# Patient Record
Sex: FEMALE | Race: WHITE
[De-identification: names, ages, dates, MRNs, and addresses within clinical notes are randomized per-mention and may not be internally consistent; named-entity substitution may affect disease eponyms.]

---

## 2018-10-20 ENCOUNTER — HOSPITAL ENCOUNTER (INPATIENT)
Dept: HOSPITAL 96 - M.ERS | Age: 78
LOS: 5 days | Discharge: HOME | DRG: 77 | End: 2018-10-25
Attending: FAMILY MEDICINE | Admitting: FAMILY MEDICINE
Payer: COMMERCIAL

## 2018-10-20 VITALS — SYSTOLIC BLOOD PRESSURE: 175 MMHG | DIASTOLIC BLOOD PRESSURE: 78 MMHG

## 2018-10-20 VITALS — SYSTOLIC BLOOD PRESSURE: 202 MMHG | DIASTOLIC BLOOD PRESSURE: 92 MMHG

## 2018-10-20 VITALS — WEIGHT: 123 LBS | HEIGHT: 64.02 IN | BODY MASS INDEX: 21 KG/M2

## 2018-10-20 VITALS — SYSTOLIC BLOOD PRESSURE: 208 MMHG | DIASTOLIC BLOOD PRESSURE: 100 MMHG

## 2018-10-20 VITALS — DIASTOLIC BLOOD PRESSURE: 63 MMHG | SYSTOLIC BLOOD PRESSURE: 179 MMHG

## 2018-10-20 DIAGNOSIS — J44.9: ICD-10-CM

## 2018-10-20 DIAGNOSIS — J96.91: ICD-10-CM

## 2018-10-20 DIAGNOSIS — N28.1: ICD-10-CM

## 2018-10-20 DIAGNOSIS — Z98.49: ICD-10-CM

## 2018-10-20 DIAGNOSIS — R13.12: ICD-10-CM

## 2018-10-20 DIAGNOSIS — I16.0: ICD-10-CM

## 2018-10-20 DIAGNOSIS — E11.40: ICD-10-CM

## 2018-10-20 DIAGNOSIS — I70.1: ICD-10-CM

## 2018-10-20 DIAGNOSIS — Z90.710: ICD-10-CM

## 2018-10-20 DIAGNOSIS — F17.210: ICD-10-CM

## 2018-10-20 DIAGNOSIS — I67.4: Primary | ICD-10-CM

## 2018-10-20 DIAGNOSIS — Z90.49: ICD-10-CM

## 2018-10-20 DIAGNOSIS — E78.5: ICD-10-CM

## 2018-10-20 DIAGNOSIS — Z79.899: ICD-10-CM

## 2018-10-20 DIAGNOSIS — I49.9: ICD-10-CM

## 2018-10-20 DIAGNOSIS — R29.6: ICD-10-CM

## 2018-10-20 DIAGNOSIS — I10: ICD-10-CM

## 2018-10-20 DIAGNOSIS — Z82.49: ICD-10-CM

## 2018-10-20 LAB
ABSOLUTE BASOPHILS: 0.1 THOU/UL (ref 0–0.2)
ABSOLUTE EOSINOPHILS: 0.3 THOU/UL (ref 0–0.7)
ABSOLUTE MONOCYTES: 0.5 THOU/UL (ref 0–1.2)
ALBUMIN SERPL-MCNC: 3.3 G/DL (ref 3.4–5)
ALP SERPL-CCNC: 73 U/L (ref 46–116)
ALT SERPL-CCNC: 29 U/L (ref 30–65)
ANION GAP SERPL CALC-SCNC: 5 MMOL/L (ref 7–16)
APTT BLD: 27.9 SECONDS (ref 25–31.3)
AST SERPL-CCNC: 36 U/L (ref 15–37)
BASOPHILS NFR BLD AUTO: 1 %
BILIRUB SERPL-MCNC: 0.3 MG/DL
BUN SERPL-MCNC: 26 MG/DL (ref 7–18)
CALCIUM SERPL-MCNC: 9 MG/DL (ref 8.5–10.1)
CHLORIDE SERPL-SCNC: 106 MMOL/L (ref 98–107)
CO2 SERPL-SCNC: 29 MMOL/L (ref 21–32)
CREAT SERPL-MCNC: 1 MG/DL (ref 0.6–1.3)
EOSINOPHIL NFR BLD: 3.5 %
GLUCOSE SERPL-MCNC: 92 MG/DL (ref 70–99)
GRANULOCYTES NFR BLD MANUAL: 52.3 %
HCT VFR BLD CALC: 38.9 % (ref 37–47)
HGB BLD-MCNC: 12.8 GM/DL (ref 12–15)
INR PPP: 0.9
LIPASE: 166 U/L (ref 73–393)
LYMPHOCYTES # BLD: 2.6 THOU/UL (ref 0.8–5.3)
LYMPHOCYTES NFR BLD AUTO: 36.6 %
MCH RBC QN AUTO: 32.2 PG (ref 26–34)
MCHC RBC AUTO-ENTMCNC: 32.8 G/DL (ref 28–37)
MCV RBC: 98.1 FL (ref 80–100)
MONOCYTES NFR BLD: 6.6 %
MPV: 9.3 FL. (ref 7.2–11.1)
NEUTROPHILS # BLD: 3.8 THOU/UL (ref 1.6–8.1)
NT-PRO BRAIN NAT PEPTIDE: 1334 PG/ML (ref ?–300)
NUCLEATED RBCS: 0 /100WBC
PLATELET COUNT*: 270 THOU/UL (ref 150–400)
POTASSIUM SERPL-SCNC: 4.5 MMOL/L (ref 3.5–5.1)
PROT SERPL-MCNC: 7.2 G/DL (ref 6.4–8.2)
PROTHROMBIN TIME: 9.7 SECONDS (ref 9.2–11.5)
RBC # BLD AUTO: 3.97 MIL/UL (ref 4.2–5)
RDW-CV: 13 % (ref 10.5–14.5)
SODIUM SERPL-SCNC: 140 MMOL/L (ref 136–145)
TROPONIN-I LEVEL: <0.06 NG/ML (ref ?–0.06)
WBC # BLD AUTO: 7.2 THOU/UL (ref 4–11)

## 2018-10-21 VITALS — SYSTOLIC BLOOD PRESSURE: 174 MMHG | DIASTOLIC BLOOD PRESSURE: 60 MMHG

## 2018-10-21 VITALS — SYSTOLIC BLOOD PRESSURE: 208 MMHG | DIASTOLIC BLOOD PRESSURE: 86 MMHG

## 2018-10-21 VITALS — SYSTOLIC BLOOD PRESSURE: 206 MMHG | DIASTOLIC BLOOD PRESSURE: 86 MMHG

## 2018-10-21 VITALS — SYSTOLIC BLOOD PRESSURE: 145 MMHG | DIASTOLIC BLOOD PRESSURE: 66 MMHG

## 2018-10-21 VITALS — DIASTOLIC BLOOD PRESSURE: 51 MMHG | SYSTOLIC BLOOD PRESSURE: 154 MMHG

## 2018-10-21 VITALS — DIASTOLIC BLOOD PRESSURE: 64 MMHG | SYSTOLIC BLOOD PRESSURE: 167 MMHG

## 2018-10-21 VITALS — SYSTOLIC BLOOD PRESSURE: 152 MMHG | DIASTOLIC BLOOD PRESSURE: 73 MMHG

## 2018-10-21 VITALS — DIASTOLIC BLOOD PRESSURE: 54 MMHG | SYSTOLIC BLOOD PRESSURE: 141 MMHG

## 2018-10-21 LAB
ALBUMIN SERPL-MCNC: 3.1 G/DL (ref 3.4–5)
ALP SERPL-CCNC: 67 U/L (ref 46–116)
ALT SERPL-CCNC: 25 U/L (ref 30–65)
ANION GAP SERPL CALC-SCNC: 4 MMOL/L (ref 7–16)
AST SERPL-CCNC: 18 U/L (ref 15–37)
BILIRUB SERPL-MCNC: 0.2 MG/DL
BUN SERPL-MCNC: 16 MG/DL (ref 7–18)
CALCIUM SERPL-MCNC: 9.1 MG/DL (ref 8.5–10.1)
CHLORIDE SERPL-SCNC: 104 MMOL/L (ref 98–107)
CO2 SERPL-SCNC: 32 MMOL/L (ref 21–32)
CREAT SERPL-MCNC: 1 MG/DL (ref 0.6–1.3)
GLUCOSE SERPL-MCNC: 94 MG/DL (ref 70–99)
HCT VFR BLD CALC: 38 % (ref 37–47)
HGB BLD-MCNC: 12.3 GM/DL (ref 12–15)
MAGNESIUM SERPL-MCNC: 1.9 MG/DL (ref 1.8–2.4)
MCH RBC QN AUTO: 31.7 PG (ref 26–34)
MCHC RBC AUTO-ENTMCNC: 32.4 G/DL (ref 28–37)
MCV RBC: 98 FL (ref 80–100)
MPV: 9.5 FL. (ref 7.2–11.1)
PLATELET COUNT*: 269 THOU/UL (ref 150–400)
POTASSIUM SERPL-SCNC: 4.2 MMOL/L (ref 3.5–5.1)
PROT SERPL-MCNC: 6.3 G/DL (ref 6.4–8.2)
RBC # BLD AUTO: 3.87 MIL/UL (ref 4.2–5)
RDW-CV: 13.2 % (ref 10.5–14.5)
SODIUM SERPL-SCNC: 140 MMOL/L (ref 136–145)
WBC # BLD AUTO: 7.9 THOU/UL (ref 4–11)

## 2018-10-21 NOTE — EKG
[place] [address]
Phone:  [phone]                     ELECTROCARDIOGRAM REPORT      
_______________________________________________________________________________
 
Name:       NIRAV HANNA                   Room:           [address]    ADM IN  
[facility]#:  [id number]      Account #:      [id number]  
Admission:  10/20/18     Attend Phys:    Scar Ryan, 
Discharge:               Date of Birth:  [birth year]  
         Report #: [id number]
    [id number]
_______________________________________________________________________________
THIS REPORT FOR:  //name//                      
 
                         [facility] ED
                                       
Test Date:    2018-10-20               Test Time:    11:13:19
Pat Name:     NIRAV HANNA               Department:   
Patient ID:   [id number]            Room:         [facility]
Gender:       F                        Technician:   REESE CARVALHO
:          1940               Requested By: Ghislaine Travis
Order Number: [id number] MD:   Eduardo Erickson
                                 Measurements
Intervals                              Axis          
Rate:         41                       P:            67
NH:           202                      QRS:          18
QRSD:         77                       T:            36
QT:           481                                    
QTc:          398                                    
                           Interpretive Statements
Sinus bradycardia
septal q waves
Supraventricular bigeminy
Compared to ECG 2017 14:26:43
Atrial premature complex(es) now present
 
Electronically Signed On 10- 11:01:55 CDT by Eduardo Erickson
[email or web address]
 
 
 
 
 
 
 
 
 
 
 
 
 
 
 
 
 
  <ELECTRONICALLY SIGNED>
                                           By: Eduardo Erickson MD, FACC      
  10/21/18     1101
D: 10/20/18 1113   _____________________________________
T: 10/20/18 1113   Eduardo Erickson MD, FAC        /EPI

## 2018-10-22 VITALS — SYSTOLIC BLOOD PRESSURE: 135 MMHG | DIASTOLIC BLOOD PRESSURE: 64 MMHG

## 2018-10-22 VITALS — SYSTOLIC BLOOD PRESSURE: 130 MMHG | DIASTOLIC BLOOD PRESSURE: 80 MMHG

## 2018-10-22 VITALS — SYSTOLIC BLOOD PRESSURE: 157 MMHG | DIASTOLIC BLOOD PRESSURE: 58 MMHG

## 2018-10-22 VITALS — SYSTOLIC BLOOD PRESSURE: 136 MMHG | DIASTOLIC BLOOD PRESSURE: 61 MMHG

## 2018-10-22 VITALS — SYSTOLIC BLOOD PRESSURE: 168 MMHG | DIASTOLIC BLOOD PRESSURE: 70 MMHG

## 2018-10-22 VITALS — DIASTOLIC BLOOD PRESSURE: 61 MMHG | SYSTOLIC BLOOD PRESSURE: 136 MMHG

## 2018-10-22 LAB
CHOLEST SERPL-MCNC: 223 MG/DL (ref ?–200)
HDLC SERPL-MCNC: 45 MG/DL (ref 40–?)
LDLC SERPL-MCNC: 114 MG/DL (ref ?–100)
SERUM ASSESSMENT: CLEAR
TC:HDL: 5 RATIO
TRIGL SERPL-MCNC: 323 MG/DL (ref ?–150)
VLDLC SERPL CALC-MCNC: 65 MG/DL (ref ?–40)
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
## 2018-10-22 NOTE — 2DMMODE
[place] [address]
Phone:  [phone] 2 D/M-MODE ECHOCARDIOGRAM     
_______________________________________________________________________________
 
Name:         NIRAV HANNA                  Room:          [address] [place] 
[facility]#:    [id number]     Account #:     [id number]  
Admission:    10/20/18    Attend Phys:   Scar Jimenez
Discharge:                Date of Birth: [birth year]  
Date of Service: 10/22/18 1700  Report #:      [id number]
        [id number]
_______________________________________________________________________________
THIS REPORT FOR:  //name//                      
 
 
--------------- APPROVED REPORT --------------
 
 
Study performed:  10/22/2018 15:11:44
 
EXAM: Comprehensive 2D, Doppler, and color-flow 
Echocardiogram 
Patient Location: In-Patient   
Room #:  [facility]     Status:  routine
 
      BSA:         1.72
HR: 56 bpm BP:          167/70 mmHg 
Rhythm: NSR     
 
Other Information 
Study Quality: Good
 
Indications
Hypertension/HDD
 
2D Dimensions
IVSd:  8.53 (7-11mm) LVOT Diam:  19.08 (18-24mm) 
LVDd:  45.46 mm  
PWd:  8.70 (7-11mm) Ascending Ao:  30.37 (22-36mm)
LVDs:  21.51 (25-40mm) 
Aortic Root:  29.89 mm 
 
Volumes
Left Atrial Volume (Systole) 
    LA ESV Index:  23.30 mL/m2
 
Aortic Valve
AoV Peak Manny.:  1.41 m/s 
AO Peak Gr.:  7.97 mmHg  LVOT Max P.48 mmHg
AO Mean Gr.:  4.15 mmHg  LVOT Mean P.74 mmHg
    LVOT Max V:  1.17 m/s
AO V2 VTI:  28.15 cm  LVOT Mean V:  0.76 m/s
TAYLA (VTI):  2.74 cm2  LVOT V1 VTI:  26.99 cm
 
Mitral Valve
    E/A Ratio:  0.69
    MV Decel. Time:  400.06 ms
MV E Max Manny.:  0.46 m/s 
 
 
[place] [address]
Phone:  [phone]                     2 D/M-MODE ECHOCARDIOGRAM     
_______________________________________________________________________________
 
Name:         NIRAV HANNA                  Room:          [address] [place] 
[facility]#:    [id number]     Account #:     [id number]  
Admission:    10/20/18    Attend Phys:   Scar Jimenez
Discharge:                Date of Birth: [birth year]  
Date of Service: 10/22/18 1700  Report #:      [id number]
        [id number]
_______________________________________________________________________________
MV PHT:  116.02 ms  
MVA (PHT):  1.90 cm2  
 
TDI
E/Lateral E':  5.75 E/Medial E':  9.20
   Medial E' Manny.:  0.05 m/s
   Lateral E' Manny.:  0.08 m/s
 
Pulmonary Valve
PV Peak Manny.:  0.92 m/s PV Peak Gr.:  3.38 mmHg
 
Left Ventricle
The left ventricle is normal size. There is normal LV segmental wall 
motion. There is normal left ventricular wall thickness. Left 
ventricular systolic function is normal. The left ventricular 
ejection fraction is within the normal range. LVEF is 55-60%. Grade I 
- abnormal relaxation pattern.
 
Right Ventricle
The right ventricle is normal size. The right ventricular systolic 
function is normal.
 
Atria
The left atrium size is normal. The right atrium size is 
normal.
 
Aortic Valve
Mild aortic valve sclerosis. No aortic regurgitation is present. 
There is no aortic valvular stenosis.
 
Mitral Valve
The mitral valve is normal in structure. Trace mitral regurgitation. 
No evidence of mitral valve stenosis.
 
Tricuspid Valve
The tricuspid valve is normal in structure. Unable to assess PA 
pressure. Trace tricuspid regurgitation.
 
Pulmonic Valve
The pulmonary valve is normal in structure. There is no pulmonic 
valvular regurgitation.
 
Great Vessels
The aortic root is normal in size. IVC is normal in size and 
collapses &gt;50% with inspiration.
 
 
 
[place] [address]
Phone:  [phone]                     2 D/M-MODE ECHOCARDIOGRAM     
_______________________________________________________________________________
 
Name:         NIRAV HANNA                  Room:          [address] [place] 
[facility]#:    [id number]     Account #:     [id number]  
Admission:    10/20/18    Attend Phys:   Scar Jimenez
Discharge:                Date of Birth: [birth year]  
Date of Service: 10/22/18 1700  Report #:      [id number]
        [id number]
_______________________________________________________________________________
Pericardium
There is no pericardial effusion.
 
&lt;Conclusion&gt;
LVEF is 55-60%.
There is normal LV segmental wall motion.
Grade I - abnormal relaxation pattern.
Mild aortic valve sclerosis.
There is no aortic valvular stenosis.
No aortic regurgitation is present.
Trace mitral regurgitation.
 
 
 
 
 
 
 
 
 
 
 
 
 
 
 
 
 
 
 
 
 
 
 
 
 
 
 
 
 
 
 
 
 
  <ELECTRONICALLY SIGNED>
                                           By: Jesus Nicole MD, FACC   
  10/22/18     1700
D: 10/22/18 1700   _____________________________________
T: 10/22/18 1700   Jesus Nicole MD, FACC     /INF

## 2018-10-23 VITALS — SYSTOLIC BLOOD PRESSURE: 151 MMHG | DIASTOLIC BLOOD PRESSURE: 55 MMHG

## 2018-10-23 VITALS — SYSTOLIC BLOOD PRESSURE: 134 MMHG | DIASTOLIC BLOOD PRESSURE: 66 MMHG

## 2018-10-23 VITALS — DIASTOLIC BLOOD PRESSURE: 55 MMHG | SYSTOLIC BLOOD PRESSURE: 151 MMHG

## 2018-10-23 VITALS — SYSTOLIC BLOOD PRESSURE: 167 MMHG | DIASTOLIC BLOOD PRESSURE: 74 MMHG

## 2018-10-23 VITALS — SYSTOLIC BLOOD PRESSURE: 119 MMHG | DIASTOLIC BLOOD PRESSURE: 75 MMHG

## 2018-10-23 VITALS — DIASTOLIC BLOOD PRESSURE: 72 MMHG | SYSTOLIC BLOOD PRESSURE: 123 MMHG

## 2018-10-23 VITALS — SYSTOLIC BLOOD PRESSURE: 128 MMHG | DIASTOLIC BLOOD PRESSURE: 75 MMHG

## 2018-10-24 VITALS — DIASTOLIC BLOOD PRESSURE: 68 MMHG | SYSTOLIC BLOOD PRESSURE: 176 MMHG

## 2018-10-24 VITALS — DIASTOLIC BLOOD PRESSURE: 61 MMHG | SYSTOLIC BLOOD PRESSURE: 140 MMHG

## 2018-10-24 VITALS — DIASTOLIC BLOOD PRESSURE: 71 MMHG | SYSTOLIC BLOOD PRESSURE: 154 MMHG

## 2018-10-24 VITALS — SYSTOLIC BLOOD PRESSURE: 140 MMHG | DIASTOLIC BLOOD PRESSURE: 61 MMHG

## 2018-10-24 VITALS — DIASTOLIC BLOOD PRESSURE: 58 MMHG | SYSTOLIC BLOOD PRESSURE: 144 MMHG

## 2018-10-24 VITALS — DIASTOLIC BLOOD PRESSURE: 78 MMHG | SYSTOLIC BLOOD PRESSURE: 149 MMHG
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
## 2018-10-25 VITALS — SYSTOLIC BLOOD PRESSURE: 150 MMHG | DIASTOLIC BLOOD PRESSURE: 68 MMHG

## 2018-10-25 VITALS — DIASTOLIC BLOOD PRESSURE: 76 MMHG | SYSTOLIC BLOOD PRESSURE: 144 MMHG

## 2018-10-25 VITALS — SYSTOLIC BLOOD PRESSURE: 156 MMHG | DIASTOLIC BLOOD PRESSURE: 71 MMHG

## 2018-10-25 VITALS — DIASTOLIC BLOOD PRESSURE: 50 MMHG | SYSTOLIC BLOOD PRESSURE: 131 MMHG

## 2018-11-05 NOTE — CON
[address]  [address]                    CONSULTATION                  
_______________________________________________________________________________
 
Name:       NIRAV HANNA                   Room:           [address] [place]  
.R.#:  [id number]      Account #:      [id number]  
Admission:  10/20/18     Attend Phys:    Scar Ryan, 
Discharge:  10/25/18     Date of Birth:  [birth year]  
         Report #: [id number]
                                                                     [id number]  
_______________________________________________________________________________
THIS REPORT FOR:  //name//                      
 
CC: Tara Ryan MD
 
DATE OF SERVICE:  10/21/2018
 
 
REFERRING PHYSICIAN:  Scar Ryan MD
 
REASON FOR CONSULTATION:  Dysphagia.
 
IMPRESSION:
1.  Dysphagia -- oropharyngeal versus esophageal with the patient having had an
abnormal video swallow study earlier this year.
2.  Recurrent falls of uncertain etiology.
3.  Symptomatic bradycardia with orthostasis.
 
RECOMMENDATIONS:  At the present time, we will await the results of the
patient's video swallow study.  Her Speech Pathology is okay with her getting a
barium swallow after a video swallow study that will be helpful for us.  If,
however, she is not a candidate for barium swallow with regular contrast that
was used, she may need to undergo an upper endoscopy the following day.  I have
discussed the plans with the patient as well and she is agreeable to the same.
 
HISTORY OF PRESENT ILLNESS:  The patient is a very pleasant 77-year-old white
female who had some issues with weakness and balance, who was admitted to
hospital with complaints of orthostasis and multiple falls.  Her other problem
is shortness of breath.  She denies ____  and this is not new for her.  She has
had previous video swallow studies done in the past which have revealed evidence
for probable chronic dysphagia.  She denies any worsening dysphagia to meats or
salads, but has problems ____ when she eats it.  She denies any complaints of
any heartburn or indigestion.  She denied problems with bowels or bowel
frequency.  She is referred to us for evaluation of her dysphagia.
 
ALLERGIES:  None.
 
MEDICATIONS:  Include hydrocodone, amlodipine, lisinopril.  She also takes
Celexa, Valium, metformin, estradiol, Xanax, Neurontin, Hyzaar, omeprazole.
 
PAST MEDICAL HISTORY:  Remarkable for hypertension.  She has problems with
anxiety, depression, diabetes.  She has had previous brain tumor removed
partially.
 
 
 
 
[place] [address]                    CONSULTATION                  
_______________________________________________________________________________
 
Name:       CYNDINIRAV BORIS                   Room:           M.[address]    [place]  
M.R.#:  [id number]      Account #:      [id number]  
Admission:  10/20/18     Attend Phys:    Scar Ryan, 
Discharge:  10/25/18     Date of Birth:  [birth year]  
         Report #: [id number]
                                                                     [id number]  
_______________________________________________________________________________
SOCIAL HISTORY:  She does smoke, does not drink alcohol.
 
FAMILY HISTORY:  Negative.
 
PHYSICAL EXAMINATION:
GENERAL:  Pleasant 77-year-old elderly female who is awake and alert.
CARDIOPULMONARY:  Revealed a regular rate and rhythm.
LUNGS:  Clear.
ABDOMEN:  Soft and not tender.  No rebound or guarding.
 
DISCUSSION:  At the present time, the patient's issue seemed more oropharyngeal
than anything else.  We will await results of her video swallow study and make
further recommendations thereafter.
 
 
 
 
 
 
 
 
 
 
 
 
 
 
 
 
 
 
 
 
 
 
 
 
 
 
 
 
 
 
 
<ELECTRONICALLY SIGNED>
                                        By:  Sea Dubois DO          
11/05/18     1222
D: 11/01/18 1735_______________________________________
T: 11/02/18 1456Sea Dubois DO             /nt

## 2018-12-12 ENCOUNTER — HOSPITAL ENCOUNTER (INPATIENT)
Dept: HOSPITAL 96 - M.ERS | Age: 78
LOS: 1 days | Discharge: HOME | DRG: 308 | End: 2018-12-13
Attending: INTERNAL MEDICINE | Admitting: INTERNAL MEDICINE
Payer: COMMERCIAL

## 2018-12-12 VITALS — WEIGHT: 133 LBS | BODY MASS INDEX: 22.71 KG/M2 | HEIGHT: 64.02 IN

## 2018-12-12 VITALS — DIASTOLIC BLOOD PRESSURE: 75 MMHG | SYSTOLIC BLOOD PRESSURE: 129 MMHG

## 2018-12-12 VITALS — DIASTOLIC BLOOD PRESSURE: 78 MMHG | SYSTOLIC BLOOD PRESSURE: 135 MMHG

## 2018-12-12 VITALS — SYSTOLIC BLOOD PRESSURE: 125 MMHG | DIASTOLIC BLOOD PRESSURE: 66 MMHG

## 2018-12-12 VITALS — DIASTOLIC BLOOD PRESSURE: 77 MMHG | SYSTOLIC BLOOD PRESSURE: 154 MMHG

## 2018-12-12 DIAGNOSIS — R25.1: ICD-10-CM

## 2018-12-12 DIAGNOSIS — Z87.891: ICD-10-CM

## 2018-12-12 DIAGNOSIS — N17.0: ICD-10-CM

## 2018-12-12 DIAGNOSIS — Z79.899: ICD-10-CM

## 2018-12-12 DIAGNOSIS — Z86.011: ICD-10-CM

## 2018-12-12 DIAGNOSIS — I10: ICD-10-CM

## 2018-12-12 DIAGNOSIS — I48.91: Primary | ICD-10-CM

## 2018-12-12 DIAGNOSIS — Z79.82: ICD-10-CM

## 2018-12-12 DIAGNOSIS — G62.9: ICD-10-CM

## 2018-12-12 DIAGNOSIS — Z90.710: ICD-10-CM

## 2018-12-12 LAB
ABSOLUTE BASOPHILS: 0.1 THOU/UL (ref 0–0.2)
ABSOLUTE EOSINOPHILS: 0.1 THOU/UL (ref 0–0.7)
ABSOLUTE MONOCYTES: 0.5 THOU/UL (ref 0–1.2)
ALBUMIN SERPL-MCNC: 3.7 G/DL (ref 3.4–5)
ALP SERPL-CCNC: 84 U/L (ref 46–116)
ALT SERPL-CCNC: 27 U/L (ref 30–65)
ANION GAP SERPL CALC-SCNC: 11 MMOL/L (ref 7–16)
APTT BLD: 30.3 SECONDS (ref 25–31.3)
AST SERPL-CCNC: 19 U/L (ref 15–37)
BASOPHILS NFR BLD AUTO: 0.9 %
BILIRUB SERPL-MCNC: 0.3 MG/DL
BILIRUB UR-MCNC: NEGATIVE MG/DL
BUN SERPL-MCNC: 34 MG/DL (ref 7–18)
CALCIUM SERPL-MCNC: 8.8 MG/DL (ref 8.5–10.1)
CHLORIDE SERPL-SCNC: 100 MMOL/L (ref 98–107)
CO2 SERPL-SCNC: 25 MMOL/L (ref 21–32)
COLOR UR: YELLOW
CREAT SERPL-MCNC: 1.8 MG/DL (ref 0.6–1.3)
EOSINOPHIL NFR BLD: 1.4 %
GLUCOSE SERPL-MCNC: 113 MG/DL (ref 70–99)
GRANULOCYTES NFR BLD MANUAL: 56.2 %
HCT VFR BLD CALC: 41.2 % (ref 37–47)
HGB BLD-MCNC: 13.9 GM/DL (ref 12–15)
INR PPP: 1
KETONES UR STRIP-MCNC: NEGATIVE MG/DL
LIPASE: 115 U/L (ref 73–393)
LYMPHOCYTES # BLD: 2.7 THOU/UL (ref 0.8–5.3)
LYMPHOCYTES NFR BLD AUTO: 34.9 %
MCH RBC QN AUTO: 32.1 PG (ref 26–34)
MCHC RBC AUTO-ENTMCNC: 33.6 G/DL (ref 28–37)
MCV RBC: 95.5 FL (ref 80–100)
MONOCYTES NFR BLD: 6.6 %
MPV: 8.7 FL. (ref 7.2–11.1)
NEUTROPHILS # BLD: 4.3 THOU/UL (ref 1.6–8.1)
NT-PRO BRAIN NAT PEPTIDE: 116 PG/ML (ref ?–300)
NUCLEATED RBCS: 0 /100WBC
PLATELET COUNT*: 256 THOU/UL (ref 150–400)
POTASSIUM SERPL-SCNC: 3.4 MMOL/L (ref 3.5–5.1)
PROT SERPL-MCNC: 7.5 G/DL (ref 6.4–8.2)
PROT UR QL STRIP: NEGATIVE
PROTHROMBIN TIME: 10.3 SECONDS (ref 9.2–11.5)
RBC # BLD AUTO: 4.31 MIL/UL (ref 4.2–5)
RBC # UR STRIP: NEGATIVE /UL
RDW-CV: 12.8 % (ref 10.5–14.5)
SODIUM SERPL-SCNC: 136 MMOL/L (ref 136–145)
SP GR UR STRIP: 1.01 (ref 1–1.03)
TROPONIN-I LEVEL: <0.06 NG/ML (ref ?–0.06)
URINE CLARITY: CLEAR
URINE GLUCOSE-RANDOM: NEGATIVE
URINE LEUKOCYTES-REFLEX: NEGATIVE
URINE NITRITE-REFLEX: NEGATIVE
UROBILINOGEN UR STRIP-ACNC: 0.2 E.U./DL (ref 0.2–1)
WBC # BLD AUTO: 7.7 THOU/UL (ref 4–11)

## 2018-12-13 VITALS — DIASTOLIC BLOOD PRESSURE: 39 MMHG | SYSTOLIC BLOOD PRESSURE: 108 MMHG

## 2018-12-13 VITALS — SYSTOLIC BLOOD PRESSURE: 128 MMHG | DIASTOLIC BLOOD PRESSURE: 40 MMHG

## 2018-12-13 VITALS — DIASTOLIC BLOOD PRESSURE: 48 MMHG | SYSTOLIC BLOOD PRESSURE: 115 MMHG

## 2018-12-13 VITALS — SYSTOLIC BLOOD PRESSURE: 140 MMHG | DIASTOLIC BLOOD PRESSURE: 81 MMHG

## 2018-12-13 VITALS — DIASTOLIC BLOOD PRESSURE: 40 MMHG | SYSTOLIC BLOOD PRESSURE: 128 MMHG

## 2018-12-13 VITALS — SYSTOLIC BLOOD PRESSURE: 134 MMHG | DIASTOLIC BLOOD PRESSURE: 50 MMHG

## 2018-12-13 LAB
ANION GAP SERPL CALC-SCNC: 9 MMOL/L (ref 7–16)
BUN SERPL-MCNC: 24 MG/DL (ref 7–18)
CALCIUM SERPL-MCNC: 8.1 MG/DL (ref 8.5–10.1)
CHLORIDE SERPL-SCNC: 109 MMOL/L (ref 98–107)
CO2 SERPL-SCNC: 25 MMOL/L (ref 21–32)
CREAT SERPL-MCNC: 1.3 MG/DL (ref 0.6–1.3)
GLUCOSE SERPL-MCNC: 104 MG/DL (ref 70–99)
HCT VFR BLD CALC: 34.7 % (ref 37–47)
HGB BLD-MCNC: 11.5 GM/DL (ref 12–15)
MAGNESIUM SERPL-MCNC: 2.1 MG/DL (ref 1.8–2.4)
MCH RBC QN AUTO: 31.9 PG (ref 26–34)
MCHC RBC AUTO-ENTMCNC: 33.1 G/DL (ref 28–37)
MCV RBC: 96.6 FL (ref 80–100)
MPV: 9.4 FL. (ref 7.2–11.1)
PLATELET COUNT*: 227 THOU/UL (ref 150–400)
POTASSIUM SERPL-SCNC: 3.9 MMOL/L (ref 3.5–5.1)
RBC # BLD AUTO: 3.59 MIL/UL (ref 4.2–5)
RDW-CV: 12.6 % (ref 10.5–14.5)
SODIUM SERPL-SCNC: 143 MMOL/L (ref 136–145)
WBC # BLD AUTO: 5.8 THOU/UL (ref 4–11)

## 2018-12-13 NOTE — EKG
[place] [address]
Phone:  [phone]                     ELECTROCARDIOGRAM REPORT      
_______________________________________________________________________________
 
Name:       MAYNIRAV GRAFF BORIS                   Room:           [address]    ADM IN  
[facility]#:  [id number]      Account #:      [id number]  
Admission:  18     Attend Phys:    Karina Patel MD 
Discharge:               Date of Birth:  [birth year]  
         Report #: [id number]
    [id number]
_______________________________________________________________________________
THIS REPORT FOR:  //name//                      
 
                         [facility] ED
                                       
Test Date:    2018               Test Time:    15:05:34
Pat Name:     NIRAV HANNA               Department:   
Patient ID:   [id number]            Room:         [facility]
Gender:       F                        Technician:   [facility]
:          1940               Requested By: Ghislaine Tripathi
Order Number: [id number] MD:   Eduardo Erickson
                                 Measurements
Intervals                              Axis          
Rate:         80                       P:            
NJ:                                    QRS:          18
QRSD:         91                       T:            60
QT:           410                                    
QTc:          473                                    
                           Interpretive Statements
sinus rhythm with consecutive pac's
septal infarct, old
Compared to ECG 10/20/2018 11:13:19
 
Sinus bradycardia no longer present
 
 
 
Electronically Signed On 2018 9:46:30 CST by Eduardo Erickson
[email or web address]
 
 
 
 
 
 
 
 
 
 
 
 
 
 
 
  <ELECTRONICALLY SIGNED>
                                           By: Eduardo Erickson MD, [facility]      
  18     0946
D: 18 1505   _____________________________________
T: 18 1505   Eduardo Erickson MD, [facility]        /EPI

## 2018-12-13 NOTE — NUR
ASSUMED PT CARE AT 0730 REPORT RECEIVED FROM NURSE. PT IS AOX4 LYING IN BED.
SARRYTHMIA ON THE CARDIAC MONITOR. IV FLUID INFUISING AS ORDERED. NO CHEST
PAIN AT TIME OF ASSESSMENT. DR EMILY GILBERT. CARDIOLOGIST CAME IN NO ORDER
RECEIVED. DR RAMOS PAGED AND COMMUNICATED ABOUT CARDIOLOGIST VISIT. CAHRT
CHECKED. MEDICINES ADMINISTERED AS ORDERED. WILL CONTINUE TO MONITOR

## 2018-12-13 NOTE — NUR
PT LEFT AT 1800 ACCOMPAINED BY DAUGHTER AND NURSING STAFF . SC INSTRUCTION
GIVEN, EDUCATION GIVEN. PT STATES UNDERSTANDING.

## 2018-12-13 NOTE — NUR
Pt is A&O. Pt resides at home with her dtr and WILLIAM. Independent with ADLs,
continues to clean and drive. Dtr completes the cooking. No DME. No hx of HH
or SNF. Cardiology following. Goal is home at [place]. No needs anticipated.

## 2019-08-14 ENCOUNTER — HOSPITAL ENCOUNTER (INPATIENT)
Dept: HOSPITAL 96 - M.ERS | Age: 79
LOS: 2 days | Discharge: HOME | DRG: 69 | End: 2019-08-16
Attending: INTERNAL MEDICINE | Admitting: INTERNAL MEDICINE
Payer: COMMERCIAL

## 2019-08-14 VITALS — HEIGHT: 64 IN | WEIGHT: 144 LBS | BODY MASS INDEX: 24.59 KG/M2

## 2019-08-14 VITALS — DIASTOLIC BLOOD PRESSURE: 100 MMHG | SYSTOLIC BLOOD PRESSURE: 144 MMHG

## 2019-08-14 VITALS — SYSTOLIC BLOOD PRESSURE: 145 MMHG | DIASTOLIC BLOOD PRESSURE: 56 MMHG

## 2019-08-14 VITALS — SYSTOLIC BLOOD PRESSURE: 145 MMHG | DIASTOLIC BLOOD PRESSURE: 76 MMHG

## 2019-08-14 VITALS — DIASTOLIC BLOOD PRESSURE: 65 MMHG | SYSTOLIC BLOOD PRESSURE: 145 MMHG

## 2019-08-14 VITALS — DIASTOLIC BLOOD PRESSURE: 76 MMHG | SYSTOLIC BLOOD PRESSURE: 147 MMHG

## 2019-08-14 DIAGNOSIS — F17.210: ICD-10-CM

## 2019-08-14 DIAGNOSIS — Z90.710: ICD-10-CM

## 2019-08-14 DIAGNOSIS — F41.9: ICD-10-CM

## 2019-08-14 DIAGNOSIS — Z79.899: ICD-10-CM

## 2019-08-14 DIAGNOSIS — F43.20: ICD-10-CM

## 2019-08-14 DIAGNOSIS — N17.0: ICD-10-CM

## 2019-08-14 DIAGNOSIS — G45.9: Primary | ICD-10-CM

## 2019-08-14 DIAGNOSIS — F32.9: ICD-10-CM

## 2019-08-14 DIAGNOSIS — I10: ICD-10-CM

## 2019-08-14 DIAGNOSIS — R47.01: ICD-10-CM

## 2019-08-14 DIAGNOSIS — G62.9: ICD-10-CM

## 2019-08-14 DIAGNOSIS — G25.71: ICD-10-CM

## 2019-08-14 DIAGNOSIS — D32.9: ICD-10-CM

## 2019-08-14 DIAGNOSIS — E87.6: ICD-10-CM

## 2019-08-14 LAB
ABSOLUTE BASOPHILS: 0.1 THOU/UL (ref 0–0.2)
ABSOLUTE EOSINOPHILS: 0.3 THOU/UL (ref 0–0.7)
ABSOLUTE MONOCYTES: 0.5 THOU/UL (ref 0–1.2)
ALBUMIN SERPL-MCNC: 3.9 G/DL (ref 3.4–5)
ALP SERPL-CCNC: 103 U/L (ref 46–116)
ALT SERPL-CCNC: 21 U/L (ref 30–65)
ANION GAP SERPL CALC-SCNC: 6 MMOL/L (ref 7–16)
ANION GAP SERPL CALC-SCNC: 7 MMOL/L (ref 7–16)
APTT BLD: 31 SECONDS (ref 25–31.3)
AST SERPL-CCNC: 19 U/L (ref 15–37)
BASOPHILS NFR BLD AUTO: 1.3 %
BILIRUB SERPL-MCNC: 0.3 MG/DL
BILIRUB UR-MCNC: NEGATIVE MG/DL
BUN SERPL-MCNC: 21 MG/DL (ref 7–18)
BUN SERPL-MCNC: 21 MG/DL (ref 7–18)
CALCIUM SERPL-MCNC: 9 MG/DL (ref 8.5–10.1)
CALCIUM SERPL-MCNC: 9.1 MG/DL (ref 8.5–10.1)
CHLORIDE SERPL-SCNC: 101 MMOL/L (ref 98–107)
CHLORIDE SERPL-SCNC: 102 MMOL/L (ref 98–107)
CO2 SERPL-SCNC: 31 MMOL/L (ref 21–32)
CO2 SERPL-SCNC: 31 MMOL/L (ref 21–32)
COLOR UR: YELLOW
CREAT SERPL-MCNC: 1.2 MG/DL (ref 0.6–1.3)
CREAT SERPL-MCNC: 1.4 MG/DL (ref 0.6–1.3)
EOSINOPHIL NFR BLD: 3.9 %
GLUCOSE SERPL-MCNC: 106 MG/DL (ref 70–99)
GLUCOSE SERPL-MCNC: 93 MG/DL (ref 70–99)
GRANULOCYTES NFR BLD MANUAL: 52.8 %
HCT VFR BLD CALC: 41.3 % (ref 37–47)
HGB BLD-MCNC: 13.9 GM/DL (ref 12–15)
INR PPP: 1
KETONES UR STRIP-MCNC: NEGATIVE MG/DL
LYMPHOCYTES # BLD: 2.8 THOU/UL (ref 0.8–5.3)
LYMPHOCYTES NFR BLD AUTO: 35.8 %
MCH RBC QN AUTO: 31.6 PG (ref 26–34)
MCHC RBC AUTO-ENTMCNC: 33.7 G/DL (ref 28–37)
MCV RBC: 93.5 FL (ref 80–100)
MONOCYTES NFR BLD: 6.2 %
MPV: 9.1 FL. (ref 7.2–11.1)
NEUTROPHILS # BLD: 4.2 THOU/UL (ref 1.6–8.1)
NUCLEATED RBCS: 0 /100WBC
PLATELET COUNT*: 329 THOU/UL (ref 150–400)
POTASSIUM SERPL-SCNC: 3.2 MMOL/L (ref 3.5–5.1)
POTASSIUM SERPL-SCNC: 3.4 MMOL/L (ref 3.5–5.1)
PROT SERPL-MCNC: 8.3 G/DL (ref 6.4–8.2)
PROT UR QL STRIP: NEGATIVE
PROTHROMBIN TIME: 10.1 SECONDS (ref 9.2–11.5)
RBC # BLD AUTO: 4.42 MIL/UL (ref 4.2–5)
RBC # UR STRIP: NEGATIVE /UL
RDW-CV: 13.4 % (ref 10.5–14.5)
SODIUM SERPL-SCNC: 139 MMOL/L (ref 136–145)
SODIUM SERPL-SCNC: 139 MMOL/L (ref 136–145)
SP GR UR STRIP: <= 1.005 (ref 1–1.03)
TROPONIN-I LEVEL: <0.06 NG/ML (ref ?–0.06)
URINE CLARITY: CLEAR
URINE GLUCOSE-RANDOM: NEGATIVE
URINE LEUKOCYTES-REFLEX: NEGATIVE
URINE NITRITE-REFLEX: NEGATIVE
UROBILINOGEN UR STRIP-ACNC: 0.2 E.U./DL (ref 0.2–1)
WBC # BLD AUTO: 7.9 THOU/UL (ref 4–11)

## 2019-08-14 NOTE — CON
[facility] 
[address] [place]  [address]                    CONSULTATION                  
_______________________________________________________________________________
 
Name:       NIRAV HANNA                   Room:           [address] [place]  
..#:  [id number]      Account #:      [id number]  
Admission:  08/14/19     Attend Phys:    TAHMINA Del Rio
Discharge:               Date of Birth:  [birth year]  
         Report #: [id number]
                                                                     [id number]  
_______________________________________________________________________________
THIS REPORT FOR:  //name//                      
 
CC: Tara Harris
 
DATE OF SERVICE:  08/14/2019
 
 
HISTORY OF PRESENT ILLNESS:  This patient was evaluated in the Emergency Room
and subsequently I saw the patient in the ICU.  I discussed the patient with Dr. Mix in the Emergency Room.  The patient apparently was doing well yesterday,
but then said she cannot talk.  She was having some unusual movement of the
face.  Family indicated that she had spells like this before, but not as severe.
 She has significant psychiatric problems and she is depressed according to
them.
 
REVIEW OF SYSTEMS:  Indicate that she has this problem with talking, but she was
able to drive.  She has a very unusual movement of the face.  She takes
psychiatric medications.  She has trouble with anxiety and depression and that
was her relevant 14-point review of system.
 
PAST MEDICAL HISTORY:  Positive for depression and anxiety.
 
FAMILY HISTORY:  Negative for early age stroke.
 
SOCIAL HISTORY:  She does not smoke.
 
PHYSICAL EXAMINATION:  The patient's examination was carried out before and now
she said her movement is better.  Her speech is much improved.  She can follow
simple command.  When distracted, she can move everything.  I reviewed the
patient's MRI and MRA.  MRI does not show any acute changes.  She did not
cooperate with full MRI testing.  She does have a meningioma, but that is
stable.
 
IMPRESSION:  The patient's symptoms appear to be psychiatric.  I will check an
EEG to exclude any further pathology, but I will suggest a psychiatric consult
in this patient.  Her GFR is low and I do not want to proceed with CT angio even
if the films are suboptimal on the MRA, but looks like large vessels are open
and clinically it looks like psychiatric symptoms.
 
Thank you very much for this referral.
 
 
 
[place] [address]                    CONSULTATION                  
_______________________________________________________________________________
 
Name:       NIRAV HANNA                   Room:           [address] [place]  
[facility]#:  [id number]      Account #:      [id number]  
Admission:  08/14/19     Attend Phys:    TAHMINA Del Rio
Discharge:               Date of Birth:  [birth year]  
         Report #: [id number]
                                                                     [id number]  
_______________________________________________________________________________
 
TIME spent about 50 minutes, about half of it counseling and coordinating.
 
 
 
 
 
 
 
 
 
 
 
 
 
 
 
 
 
 
 
 
 
 
 
 
 
 
 
 
 
 
 
 
 
 
 
 
 
 
 
 
 
 
                       
                                        By:                                
                 
D: 08/14/19 1609_______________________________________
T: 08/14/19 2301Pniecy Espinal MD            /nt

## 2019-08-14 NOTE — EEG
[address]  [address]                    EEG STUDY REPORT              
_______________________________________________________________________________
 
Name:       CYNDINIRAV BORIS                   Room:           [address]#:  [id number]      Account #:      [id number]  
Admission:  08/14/19     Attend Phys:    TAHMINA Del Rio
Discharge:               Date of Birth:  [birth year]  
         Report #: [id number]
                                                                     [id number]  
_______________________________________________________________________________
THIS REPORT FOR:  //name//                      
 
CC: Tara Harris
 
DATE OF SERVICE:  08/15/2019
 
 
This patient is being evaluated for the possibility of seizure.
 
EEG was done by placing the electrodes by standard 10-20 system of electrode
placement.  Both referential and sequential montages were used for recording. 
Background activity in this patient's EEG is about 10 Hz and 30 microvolts. 
Muscle artifact is present, but the patient went to sleep, that is associated
with bilaterally symmetrical sleep spindle and vertex sharp waves.  Photic
stimulation was unremarkable.  Throughout the record, no active epileptiform
activity was noticed.
 
IMPRESSION:  This patient's electroencephalogram is unremarkable.
 
 
 
 
 
 
 
 
 
 
 
 
 
 
 
 
 
 
 
 
 
 
 
 
 
                       
                                        By:                                
                 
D: 08/15/19 1849_______________________________________
T: 08/15/19 2105Juan Jose Espinal MD            /nt

## 2019-08-14 NOTE — NUR
PATIENT SOMEWHAT PROGRESSING WELL TOWARDS GOALS. NIH 2 AT THIS TIME. SYMPTOMS
ARE INCONSISTENT DEPENDING ON WHEN SHE IS EVALUATED AND IF FAMILY IS PRESENT.
SYMPTOMS SEEM TO BE WORSE WHILE DAUGHTERS IN THE ROOM THAN WHEN NOT. PATIENT
COULD NOT RESIST GRAVITY ON RIGHT LEG DURING INITAIL NIH UPON ADMISSION BUT AS
OF RIGHT NOW CAN TRANSFER TO COMMODE AND BACK WITH STAND BY ASSIST AND STEADY
GAIT. SWALLOW SCREEN DONE AND PASSED. NOW ON FULL LIQUID DIET AND TOLERATING
WELL. NO PAIN, NAUSEA OR SHORTNESS OF AIR. BED IN LOWEST POSITON, CALL LIGHT
IN REACH, CARDIAC MONTIOR IN PLACE

## 2019-08-15 VITALS — DIASTOLIC BLOOD PRESSURE: 83 MMHG | SYSTOLIC BLOOD PRESSURE: 182 MMHG

## 2019-08-15 VITALS — SYSTOLIC BLOOD PRESSURE: 179 MMHG | DIASTOLIC BLOOD PRESSURE: 68 MMHG

## 2019-08-15 VITALS — DIASTOLIC BLOOD PRESSURE: 43 MMHG | SYSTOLIC BLOOD PRESSURE: 145 MMHG

## 2019-08-15 VITALS — DIASTOLIC BLOOD PRESSURE: 79 MMHG | SYSTOLIC BLOOD PRESSURE: 193 MMHG

## 2019-08-15 VITALS — SYSTOLIC BLOOD PRESSURE: 169 MMHG | DIASTOLIC BLOOD PRESSURE: 76 MMHG

## 2019-08-15 VITALS — SYSTOLIC BLOOD PRESSURE: 171 MMHG | DIASTOLIC BLOOD PRESSURE: 61 MMHG

## 2019-08-15 LAB
ANION GAP SERPL CALC-SCNC: 5 MMOL/L (ref 7–16)
BUN SERPL-MCNC: 18 MG/DL (ref 7–18)
CALCIUM SERPL-MCNC: 8.5 MG/DL (ref 8.5–10.1)
CHLORIDE SERPL-SCNC: 108 MMOL/L (ref 98–107)
CO2 SERPL-SCNC: 28 MMOL/L (ref 21–32)
CREAT SERPL-MCNC: 1 MG/DL (ref 0.6–1.3)
GLUCOSE SERPL-MCNC: 98 MG/DL (ref 70–99)
HCT VFR BLD CALC: 39.4 % (ref 37–47)
HGB BLD-MCNC: 13 GM/DL (ref 12–15)
MCH RBC QN AUTO: 31.8 PG (ref 26–34)
MCHC RBC AUTO-ENTMCNC: 33 G/DL (ref 28–37)
MCV RBC: 96.2 FL (ref 80–100)
MPV: 10.1 FL. (ref 7.2–11.1)
PLATELET COUNT*: 217 THOU/UL (ref 150–400)
POTASSIUM SERPL-SCNC: 4.6 MMOL/L (ref 3.5–5.1)
RBC # BLD AUTO: 4.09 MIL/UL (ref 4.2–5)
RDW-CV: 13.6 % (ref 10.5–14.5)
SODIUM SERPL-SCNC: 141 MMOL/L (ref 136–145)
WBC # BLD AUTO: 5.7 THOU/UL (ref 4–11)

## 2019-08-15 NOTE — NUR
Pt is A&O. Resides at home with her dtr and son in law. Independent, continues
to clean and drive, dtr cooks. No DME. No hx of HH or SNF. Hx of outpt
therapy. Goal is home at [place]. Pt is tele status

## 2019-08-15 NOTE — NUR
I have reviewed the documentation by MCKENZIE ZAZUETA
from TODAY to 08/15/19 and I concur with
it.
HOSEA FIELDS

## 2019-08-15 NOTE — NUR
ASSUMED CARE AT 1910, ON ROOM AIR AND NO CHANGES IN MENTAL STATUS.NO DISTRESS
NOTED.STAND BY ASSISTANCE WHEN GOING TO COMMODE.CONTINUE MONITORING AND
TOWARDS GOALS.

## 2019-08-15 NOTE — NUR
RECIEVED REPORT FROM PREVIOUS RN AROUND 1530. CONCUR WITH PREVIOUS ASSESSMENT.
PT TRANSFERRED TO ROOM 201. ORIENTED TO ROOM. TELEPSYCH CONSULT INITIATED. NO
OTHER CONCERNS AT THIS TIME. CLWR. WCTM.

## 2019-08-15 NOTE — NUR
PATIENT TRANSFERED TO ROOM 201 VIA WHEELCHAIR IN STABLE CONDITION. ALL
BELONGINGS SENT WITH PATIENT. FAMILY NOTIFIED.

## 2019-08-16 VITALS — DIASTOLIC BLOOD PRESSURE: 66 MMHG | SYSTOLIC BLOOD PRESSURE: 166 MMHG

## 2019-08-16 VITALS — SYSTOLIC BLOOD PRESSURE: 206 MMHG | DIASTOLIC BLOOD PRESSURE: 61 MMHG

## 2019-08-16 VITALS — SYSTOLIC BLOOD PRESSURE: 150 MMHG | DIASTOLIC BLOOD PRESSURE: 108 MMHG

## 2019-08-16 VITALS — DIASTOLIC BLOOD PRESSURE: 57 MMHG | SYSTOLIC BLOOD PRESSURE: 180 MMHG

## 2019-08-16 LAB
ALBUMIN SERPL-MCNC: 3 G/DL (ref 3.4–5)
ALP SERPL-CCNC: 75 U/L (ref 46–116)
ALT SERPL-CCNC: 16 U/L (ref 30–65)
ANION GAP SERPL CALC-SCNC: 7 MMOL/L (ref 7–16)
AST SERPL-CCNC: 16 U/L (ref 15–37)
BILIRUB SERPL-MCNC: 0.2 MG/DL
BUN SERPL-MCNC: 9 MG/DL (ref 7–18)
CALCIUM SERPL-MCNC: 8.2 MG/DL (ref 8.5–10.1)
CHLORIDE SERPL-SCNC: 109 MMOL/L (ref 98–107)
CHOLEST SERPL-MCNC: 199 MG/DL (ref ?–200)
CO2 SERPL-SCNC: 26 MMOL/L (ref 21–32)
CREAT SERPL-MCNC: 0.8 MG/DL (ref 0.6–1.3)
GLUCOSE SERPL-MCNC: 91 MG/DL (ref 70–99)
HCT VFR BLD CALC: 34 % (ref 37–47)
HDLC SERPL-MCNC: 41 MG/DL (ref 40–?)
HGB BLD-MCNC: 11.2 GM/DL (ref 12–15)
LDLC SERPL-MCNC: 128 MG/DL (ref ?–100)
MAGNESIUM SERPL-MCNC: 1.8 MG/DL (ref 1.8–2.4)
MCH RBC QN AUTO: 31.8 PG (ref 26–34)
MCHC RBC AUTO-ENTMCNC: 33 G/DL (ref 28–37)
MCV RBC: 96.4 FL (ref 80–100)
MPV: 9.9 FL. (ref 7.2–11.1)
PLATELET COUNT*: 203 THOU/UL (ref 150–400)
POTASSIUM SERPL-SCNC: 4.4 MMOL/L (ref 3.5–5.1)
PROT SERPL-MCNC: 5.9 G/DL (ref 6.4–8.2)
RBC # BLD AUTO: 3.53 MIL/UL (ref 4.2–5)
RDW-CV: 13.5 % (ref 10.5–14.5)
SERUM ASSESSMENT: CLEAR
SODIUM SERPL-SCNC: 142 MMOL/L (ref 136–145)
TC:HDL: 4.9 RATIO
TRIGL SERPL-MCNC: 152 MG/DL (ref ?–150)
VLDLC SERPL CALC-MCNC: 30 MG/DL (ref ?–40)
WBC # BLD AUTO: 5.9 THOU/UL (ref 4–11)

## 2019-08-16 NOTE — NUR
ORDERS RECEIVED FOR OKAY TO D/C TO HOME THIS SHIFT PER - IV TO
LEFT FA D/C'D ALONG WITH CARDIAC MONITOR PRIOR TO D/C- D/C
EDUCATION/TEACHING/NEEDED FOLLOW UP'S COMMUNICATED WITH VERBAL UNDERSTANDING
NOTED PER PT- WRITTEN EDUCATION ALONG WITH SCRIPTS PROVIDED TO PT PRIOR TO
D/C- ALL QUESTIONS AND CONCERNS ADDRESSED PRIOR TO D/C- BELONGINGS PACKED AND
ACCOUNTED FOR PER PT- PT CURRENTLY DRESSED WITH BELONGINGS AWAITTING RIDE FOR
D/C- ALL NEEDS MET AT THIS TIME-WCTM

## 2019-08-16 NOTE — NUR
ASSUMED PT CARE AT APPROX 1930. PT IS AWAKE AND ORIENTED X4. VSS ON ROOM AIR.
CARDIAC MONITOR IN PLACE TRACING SR/SA w/ OCCASSIONAL PACs. ASSESSMENT DONE
AND CHARTED. PT C/O BACK AND LEG PAIN PARTIALLY RELIEVED BY PAIN MEDS GIVEN
PER MAR. PT IS ABLE TO SLEEP MOST OF THE NIGHT. CALL LIGHT WITHIN REACH.
HOURLY ROUNDING DONE FOR PT SAFETY.

## 2020-03-06 ENCOUNTER — HOSPITAL ENCOUNTER (INPATIENT)
Dept: HOSPITAL 96 - M.ERS | Age: 80
LOS: 7 days | Discharge: HOME HEALTH | DRG: 682 | End: 2020-03-13
Attending: INTERNAL MEDICINE | Admitting: INTERNAL MEDICINE
Payer: COMMERCIAL

## 2020-03-06 VITALS — SYSTOLIC BLOOD PRESSURE: 181 MMHG | DIASTOLIC BLOOD PRESSURE: 56 MMHG

## 2020-03-06 VITALS — HEIGHT: 62.99 IN | WEIGHT: 145 LBS | BODY MASS INDEX: 25.69 KG/M2

## 2020-03-06 VITALS — DIASTOLIC BLOOD PRESSURE: 76 MMHG | SYSTOLIC BLOOD PRESSURE: 136 MMHG

## 2020-03-06 VITALS — DIASTOLIC BLOOD PRESSURE: 56 MMHG | SYSTOLIC BLOOD PRESSURE: 181 MMHG

## 2020-03-06 DIAGNOSIS — M54.9: ICD-10-CM
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
## 2020-03-06 DIAGNOSIS — Z72.89: ICD-10-CM

## 2020-03-06 DIAGNOSIS — N17.9: Primary | ICD-10-CM

## 2020-03-06 DIAGNOSIS — T45.515A: ICD-10-CM

## 2020-03-06 DIAGNOSIS — E78.5: ICD-10-CM

## 2020-03-06 DIAGNOSIS — J44.0: ICD-10-CM

## 2020-03-06 DIAGNOSIS — Z85.841: ICD-10-CM

## 2020-03-06 DIAGNOSIS — S30.1XXA: ICD-10-CM

## 2020-03-06 DIAGNOSIS — F17.210: ICD-10-CM
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
## 2020-03-06 DIAGNOSIS — Z90.710: ICD-10-CM

## 2020-03-06 DIAGNOSIS — I11.0: ICD-10-CM

## 2020-03-06 DIAGNOSIS — I50.33: ICD-10-CM

## 2020-03-06 DIAGNOSIS — G57.93: ICD-10-CM

## 2020-03-06 DIAGNOSIS — J10.1: ICD-10-CM

## 2020-03-06 DIAGNOSIS — Z79.899: ICD-10-CM
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
## 2020-03-06 DIAGNOSIS — J96.01: ICD-10-CM
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
## 2020-03-06 DIAGNOSIS — Z79.82: ICD-10-CM

## 2020-03-06 DIAGNOSIS — J44.1: ICD-10-CM

## 2020-03-06 DIAGNOSIS — Z90.49: ICD-10-CM

## 2020-03-06 DIAGNOSIS — F32.9: ICD-10-CM

## 2020-03-06 DIAGNOSIS — G89.29: ICD-10-CM

## 2020-03-06 LAB
ABSOLUTE BASOPHILS: 0.1 THOU/UL (ref 0–0.2)
ABSOLUTE EOSINOPHILS: 0 THOU/UL (ref 0–0.7)
ABSOLUTE MONOCYTES: 0.5 THOU/UL (ref 0–1.2)
ALBUMIN SERPL-MCNC: 3.3 G/DL (ref 3.4–5)
ALP SERPL-CCNC: 71 U/L (ref 46–116)
ALT SERPL-CCNC: 31 U/L (ref 30–65)
ANION GAP SERPL CALC-SCNC: 7 MMOL/L (ref 7–16)
APTT BLD: 28.2 SECONDS (ref 25–31.3)
AST SERPL-CCNC: 40 U/L (ref 15–37)
BASOPHILS NFR BLD AUTO: 0.7 %
BILIRUB SERPL-MCNC: 0.2 MG/DL
BUN SERPL-MCNC: 21 MG/DL (ref 7–18)
CALCIUM SERPL-MCNC: 8.1 MG/DL (ref 8.5–10.1)
CHLORIDE SERPL-SCNC: 100 MMOL/L (ref 98–107)
CO2 SERPL-SCNC: 29 MMOL/L (ref 21–32)
CREAT SERPL-MCNC: 1.2 MG/DL (ref 0.6–1.3)
EOSINOPHIL NFR BLD: 0.4 %
GLUCOSE SERPL-MCNC: 92 MG/DL (ref 70–99)
GRANULOCYTES NFR BLD MANUAL: 82.6 %
HCT VFR BLD CALC: 33.9 % (ref 37–47)
HGB BLD-MCNC: 11.5 GM/DL (ref 12–15)
INR PPP: 1
LYMPHOCYTES # BLD: 0.8 THOU/UL (ref 0.8–5.3)
LYMPHOCYTES NFR BLD AUTO: 9.7 %
MCH RBC QN AUTO: 32 PG (ref 26–34)
MCHC RBC AUTO-ENTMCNC: 33.9 G/DL (ref 28–37)
MCV RBC: 94.6 FL (ref 80–100)
MONOCYTES NFR BLD: 6.6 %
MPV: 9.1 FL. (ref 7.2–11.1)
NEUTROPHILS # BLD: 6.7 THOU/UL (ref 1.6–8.1)
NT-PRO BRAIN NAT PEPTIDE: 2713 PG/ML (ref ?–300)
NUCLEATED RBCS: 0 /100WBC
PLATELET COUNT*: 260 THOU/UL (ref 150–400)
POTASSIUM SERPL-SCNC: 3.9 MMOL/L (ref 3.5–5.1)
PROT SERPL-MCNC: 6.8 G/DL (ref 6.4–8.2)
PROTHROMBIN TIME: 10 SECONDS (ref 9.2–11.5)
RBC # BLD AUTO: 3.58 MIL/UL (ref 4.2–5)
RDW-CV: 14.3 % (ref 10.5–14.5)
SODIUM SERPL-SCNC: 136 MMOL/L (ref 136–145)
WBC # BLD AUTO: 8.2 THOU/UL (ref 4–11)

## 2020-03-06 NOTE — EKG
[place] [address]
Phone:  [phone]                     ELECTROCARDIOGRAM REPORT      
_______________________________________________________________________________
 
Name:         NIRAV HANNA                  Room:          [place] [address]    ADM IN 
[facility]#:    [id number]     Account #:     [id number]  
Admission:    20    Attend Phys:   Karina Patel, 
Discharge:                Date of Birth: [birth year]  
Date of Service: 20 0817  Report #:      [id number]
        [id number]
_______________________________________________________________________________
THIS REPORT FOR:  //name//                      
 
                         [facility] ED
                                       
Test Date:    2020               Test Time:    08:17:00
Pat Name:     NIRAV HANNA               Department:   
Patient ID:   [id number]            Room:         [facility]
Gender:       F                        Technician:   TS
:          1940               Requested By: Papi Begum
Order Number: [id number] MD:   Amaury Pereira
                                 Measurements
Intervals                              Axis          
Rate:         66                       P:            69
MO:           175                      QRS:          46
QRSD:         76                       T:            63
QT:           408                                    
QTc:          428                                    
                           Interpretive Statements
Sinus arrhythmia
Anteroseptal infarct, age indeterminate
Compared to ECG 2018 15:05:34
Sinus rhythm no longer present
Myocardial infarct finding still present
 
Electronically Signed On 3-6-2020 17:41:04 CST by Amaury Pereira
[email or web address]
 
 
 
 
 
 
 
 
 
 
 
 
 
 
 
 
 
 
  <ELECTRONICALLY SIGNED>
                                           By: Amaury Pereira MD, FAC   
  20     1741
D: 20 08   _____________________________________
T: 20   Amaury Pereira MD, [facility]     /EPI

## 2020-03-06 NOTE — 2DMMODE
[place] [address]
Phone:  [phone] 2 D/M-MODE ECHOCARDIOGRAM     
_______________________________________________________________________________
 
Name:         NIRAV HANNA                  Room:          [address] [place] 
[facility]#:    [id number]     Account #:     [id number]  
Admission:    20    Attend Phys:   Karina Patel, 
Discharge:                Date of Birth: [birth year]  
Date of Service: 20 1739  Report #:      [id number]
        [id number]
_______________________________________________________________________________
THIS REPORT FOR:
 
cc:  Tara Louie Maggie M. DO Liston, Michael J. MD [facility]     
                                                                       ~
 
--------------- APPROVED REPORT --------------
 
 
Study performed:  2020 14:43:52
 
EXAM: Comprehensive 2D, Doppler, and color-flow 
Echocardiogram 
Patient Location: In-Patient   
Room #:  ER     Status:  routine
 
      BSA:         1.64
HR: 64 bpm BP:          181/56 mmHg 
Rhythm: NSR     
 
Other Information 
Study Quality: Good
 
Indications
ELEVATED BNP
 
2D Dimensions
IVSd:  8.88 (7-11mm) LVOT Diam:  20.30 (18-24mm) 
LVDd:  42.12 mm  
PWd:  10.38 (7-11mm) Ascending Ao:  30.61 (22-36mm)
LVDs:  27.44 (25-40mm) 
Aortic Root:  29.44 mm 
 
Volumes
Left Atrial Volume (Systole) 
    LA ESV Index:  30.40 mL/m2
 
Aortic Valve
AoV Peak Manny.:  1.75 m/s 
AO Peak Gr.:  12.22 mmHg LVOT Max P.16 mmHg
AO Mean Gr.:  6.86 mmHg  LVOT Mean PG:  3.75 mmHg
    LVOT Max V:  1.34 m/s
AO V2 VTI:  32.02 cm  LVOT Mean V:  0.90 m/s
TAYLA (VTI):  2.42 cm2  LVOT V1 VTI:  23.94 cm
 
 
 
[place] [address]
Phone:  [phone]                     2 D/M-MODE ECHOCARDIOGRAM     
_______________________________________________________________________________
 
Name:         NIRAV HANNA                  Room:          [address] [place] 
[facility]#:    [id number]     Account #:     [id number]  
Admission:    20    Attend Phys:   Karina Patel, 
Discharge:                Date of Birth: [birth year]  
Date of Service: 20 1739  Report #:      [id number]
        [id number]
_______________________________________________________________________________
Mitral Valve
    E/A Ratio:  0.92
    MV Decel. Time:  209.27 ms
MV E Max Manny.:  0.66 m/s 
MV PHT:  60.69 ms  
MVA (PHT):  3.63 cm2  
 
TDI
E/Lateral E':  7.33 E/Medial E':  9.43
   Medial E' Manny.:  0.07 m/s
   Lateral E' Manny.:  0.09 m/s
 
Pulmonary Valve
PV Peak Manny.:  1.14 m/s PV Peak Gr.:  5.20 mmHg
 
Tricuspid Valve
    RAP Estimate:  5.00 mmHg
TR Peak Gr.:  26.86 mmHg RVSP:  31.00 mmHg
    PA Pressure:  31.00 mmHg
 
Left Ventricle
The left ventricle is normal size. There is normal LV segmental wall 
motion. There is normal left ventricular wall thickness. Left 
ventricular systolic function is normal. LVEF is 65-70%. Transmitral 
Doppler flow pattern suggests impaired LV relaxation.
 
Right Ventricle
The right ventricle is normal size. The right ventricular systolic 
function is normal.
 
Atria
The left atrium size is normal. The right atrium size is 
normal.
 
Aortic Valve
Mild aortic valve sclerosis. No aortic regurgitation is present. 
There is no aortic valvular stenosis.
 
Mitral Valve
The mitral valve is normal in structure. Mild mitral regurgitation. 
No evidence of mitral valve stenosis.
 
Tricuspid Valve
The tricuspid valve is normal in structure. Trace tricuspid 
regurgitation. The RVSP is 30-35 mmHg.
 
 
 
[place] [address]
Phone:  [phone]                     2 D/M-MODE ECHOCARDIOGRAM     
_______________________________________________________________________________
 
Name:         NIRAV HANNA                  Room:          [address]#:    [id number]     Account #:     [id number]  
Admission:    20    Attend Phys:   Karina Patel, 
Discharge:                Date of Birth: [birth year]  
Date of Service: 20 1739  Report #:      [id number]
        [id number]
_______________________________________________________________________________
Pulmonic Valve
The pulmonary valve is normal in structure. There is no pulmonic 
valvular regurgitation.
 
Great Vessels
The aortic root is normal in size. IVC is normal in size and 
collapses >50% with inspiration.
 
Pericardium
There is no pericardial effusion.
 
<Conclusion>
The left ventricle is normal size.
There is normal left ventricular wall thickness.
Left ventricular systolic function is normal.
LVEF is 65-70%.
Transmitral Doppler flow pattern suggests impaired LV 
relaxation.
Mild aortic valve sclerosis.
There is no aortic valvular stenosis.
Mild mitral regurgitation.
Trace tricuspid regurgitation.
The RVSP is 30-35 mmHg.
IVC is normal in size and collapses >50% with inspiration.
 
 
 
 
 
 
 
 
 
 
 
 
 
 
 
 
 
 
 
 
  <ELECTRONICALLY SIGNED>
                                           By: Amaury Pereira MD, FACC   
  20     1739
D: 20 1739   _____________________________________
T: 20 1739   Amaury Pereira MD, FACC     /INF

## 2020-03-07 VITALS — SYSTOLIC BLOOD PRESSURE: 152 MMHG | DIASTOLIC BLOOD PRESSURE: 68 MMHG

## 2020-03-07 VITALS — SYSTOLIC BLOOD PRESSURE: 132 MMHG | DIASTOLIC BLOOD PRESSURE: 54 MMHG

## 2020-03-07 VITALS — DIASTOLIC BLOOD PRESSURE: 43 MMHG | SYSTOLIC BLOOD PRESSURE: 126 MMHG

## 2020-03-07 VITALS — SYSTOLIC BLOOD PRESSURE: 156 MMHG | DIASTOLIC BLOOD PRESSURE: 52 MMHG

## 2020-03-07 VITALS — SYSTOLIC BLOOD PRESSURE: 120 MMHG | DIASTOLIC BLOOD PRESSURE: 48 MMHG

## 2020-03-07 VITALS — DIASTOLIC BLOOD PRESSURE: 50 MMHG | SYSTOLIC BLOOD PRESSURE: 119 MMHG

## 2020-03-07 LAB
ANION GAP SERPL CALC-SCNC: 10 MMOL/L (ref 7–16)
BUN SERPL-MCNC: 27 MG/DL (ref 7–18)
CALCIUM SERPL-MCNC: 8.3 MG/DL (ref 8.5–10.1)
CHLORIDE SERPL-SCNC: 99 MMOL/L (ref 98–107)
CO2 SERPL-SCNC: 30 MMOL/L (ref 21–32)
CREAT SERPL-MCNC: 1.5 MG/DL (ref 0.6–1.3)
GLUCOSE SERPL-MCNC: 170 MG/DL (ref 70–99)
HCT VFR BLD CALC: 36.1 % (ref 37–47)
HGB BLD-MCNC: 12.1 GM/DL (ref 12–15)
MAGNESIUM SERPL-MCNC: 2.1 MG/DL (ref 1.8–2.4)
MCH RBC QN AUTO: 31.7 PG (ref 26–34)
MCHC RBC AUTO-ENTMCNC: 33.5 G/DL (ref 28–37)
MCV RBC: 94.7 FL (ref 80–100)
MPV: 9.8 FL. (ref 7.2–11.1)
PLATELET COUNT*: 222 THOU/UL (ref 150–400)
POTASSIUM SERPL-SCNC: 3.2 MMOL/L (ref 3.5–5.1)
RBC # BLD AUTO: 3.81 MIL/UL (ref 4.2–5)
RDW-CV: 14.5 % (ref 10.5–14.5)
SODIUM SERPL-SCNC: 139 MMOL/L (ref 136–145)
WBC # BLD AUTO: 4.2 THOU/UL (ref 4–11)
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
## 2020-03-08 VITALS — DIASTOLIC BLOOD PRESSURE: 60 MMHG | SYSTOLIC BLOOD PRESSURE: 135 MMHG

## 2020-03-08 VITALS — DIASTOLIC BLOOD PRESSURE: 46 MMHG | SYSTOLIC BLOOD PRESSURE: 131 MMHG

## 2020-03-08 VITALS — DIASTOLIC BLOOD PRESSURE: 44 MMHG | SYSTOLIC BLOOD PRESSURE: 129 MMHG

## 2020-03-08 VITALS — DIASTOLIC BLOOD PRESSURE: 40 MMHG | SYSTOLIC BLOOD PRESSURE: 122 MMHG

## 2020-03-08 VITALS — SYSTOLIC BLOOD PRESSURE: 129 MMHG | DIASTOLIC BLOOD PRESSURE: 34 MMHG

## 2020-03-08 VITALS — SYSTOLIC BLOOD PRESSURE: 137 MMHG | DIASTOLIC BLOOD PRESSURE: 51 MMHG

## 2020-03-08 LAB
ANION GAP SERPL CALC-SCNC: 10 MMOL/L (ref 7–16)
BUN SERPL-MCNC: 41 MG/DL (ref 7–18)
CALCIUM SERPL-MCNC: 8.3 MG/DL (ref 8.5–10.1)
CHLORIDE SERPL-SCNC: 99 MMOL/L (ref 98–107)
CO2 SERPL-SCNC: 30 MMOL/L (ref 21–32)
CREAT SERPL-MCNC: 1.7 MG/DL (ref 0.6–1.3)
GLUCOSE SERPL-MCNC: 150 MG/DL (ref 70–99)
HCT VFR BLD CALC: 34.4 % (ref 37–47)
HGB BLD-MCNC: 11.6 GM/DL (ref 12–15)
MAGNESIUM SERPL-MCNC: 2.2 MG/DL (ref 1.8–2.4)
MCH RBC QN AUTO: 32.2 PG (ref 26–34)
MCHC RBC AUTO-ENTMCNC: 33.9 G/DL (ref 28–37)
MCV RBC: 95.2 FL (ref 80–100)
MPV: 9.6 FL. (ref 7.2–11.1)
PLATELET COUNT*: 233 THOU/UL (ref 150–400)
POTASSIUM SERPL-SCNC: 3.2 MMOL/L (ref 3.5–5.1)
RBC # BLD AUTO: 3.61 MIL/UL (ref 4.2–5)
RDW-CV: 14.6 % (ref 10.5–14.5)
SODIUM SERPL-SCNC: 139 MMOL/L (ref 136–145)
WBC # BLD AUTO: 8.8 THOU/UL (ref 4–11)

## 2020-03-09 VITALS — SYSTOLIC BLOOD PRESSURE: 155 MMHG | DIASTOLIC BLOOD PRESSURE: 76 MMHG

## 2020-03-09 VITALS — DIASTOLIC BLOOD PRESSURE: 58 MMHG | SYSTOLIC BLOOD PRESSURE: 129 MMHG

## 2020-03-09 VITALS — SYSTOLIC BLOOD PRESSURE: 157 MMHG | DIASTOLIC BLOOD PRESSURE: 53 MMHG

## 2020-03-09 VITALS — DIASTOLIC BLOOD PRESSURE: 51 MMHG | SYSTOLIC BLOOD PRESSURE: 135 MMHG

## 2020-03-09 VITALS — SYSTOLIC BLOOD PRESSURE: 117 MMHG | DIASTOLIC BLOOD PRESSURE: 60 MMHG

## 2020-03-09 VITALS — SYSTOLIC BLOOD PRESSURE: 152 MMHG | DIASTOLIC BLOOD PRESSURE: 63 MMHG

## 2020-03-09 LAB
ALBUMIN SERPL-MCNC: 2.9 G/DL (ref 3.4–5)
ALP SERPL-CCNC: 56 U/L (ref 46–116)
ALT SERPL-CCNC: 31 U/L (ref 30–65)
ANION GAP SERPL CALC-SCNC: 7 MMOL/L (ref 7–16)
AST SERPL-CCNC: 31 U/L (ref 15–37)
BILIRUB SERPL-MCNC: 0.2 MG/DL
BUN SERPL-MCNC: 41 MG/DL (ref 7–18)
CALCIUM SERPL-MCNC: 8.7 MG/DL (ref 8.5–10.1)
CHLORIDE SERPL-SCNC: 104 MMOL/L (ref 98–107)
CO2 SERPL-SCNC: 31 MMOL/L (ref 21–32)
CREAT SERPL-MCNC: 1.4 MG/DL (ref 0.6–1.3)
GLUCOSE SERPL-MCNC: 101 MG/DL (ref 70–99)
HCT VFR BLD CALC: 35.1 % (ref 37–47)
HGB BLD-MCNC: 11.8 GM/DL (ref 12–15)
MAGNESIUM SERPL-MCNC: 2.2 MG/DL (ref 1.8–2.4)
MCH RBC QN AUTO: 31.9 PG (ref 26–34)
MCHC RBC AUTO-ENTMCNC: 33.6 G/DL (ref 28–37)
MCV RBC: 95 FL (ref 80–100)
MPV: 9.6 FL. (ref 7.2–11.1)
NT-PRO BRAIN NAT PEPTIDE: 302 PG/ML (ref ?–300)
PLATELET COUNT*: 269 THOU/UL (ref 150–400)
POTASSIUM SERPL-SCNC: 5 MMOL/L (ref 3.5–5.1)
PROT SERPL-MCNC: 6.3 G/DL (ref 6.4–8.2)
RBC # BLD AUTO: 3.69 MIL/UL (ref 4.2–5)
RDW-CV: 14.5 % (ref 10.5–14.5)
SODIUM SERPL-SCNC: 142 MMOL/L (ref 136–145)
WBC # BLD AUTO: 8.1 THOU/UL (ref 4–11)

## 2020-03-10 VITALS — SYSTOLIC BLOOD PRESSURE: 154 MMHG | DIASTOLIC BLOOD PRESSURE: 74 MMHG

## 2020-03-10 VITALS — SYSTOLIC BLOOD PRESSURE: 121 MMHG | DIASTOLIC BLOOD PRESSURE: 52 MMHG

## 2020-03-10 VITALS — DIASTOLIC BLOOD PRESSURE: 75 MMHG | SYSTOLIC BLOOD PRESSURE: 129 MMHG

## 2020-03-10 VITALS — DIASTOLIC BLOOD PRESSURE: 37 MMHG | SYSTOLIC BLOOD PRESSURE: 116 MMHG

## 2020-03-11 VITALS — DIASTOLIC BLOOD PRESSURE: 60 MMHG | SYSTOLIC BLOOD PRESSURE: 101 MMHG

## 2020-03-11 VITALS — DIASTOLIC BLOOD PRESSURE: 52 MMHG | SYSTOLIC BLOOD PRESSURE: 100 MMHG

## 2020-03-11 VITALS — SYSTOLIC BLOOD PRESSURE: 121 MMHG | DIASTOLIC BLOOD PRESSURE: 53 MMHG

## 2020-03-11 VITALS — SYSTOLIC BLOOD PRESSURE: 127 MMHG | DIASTOLIC BLOOD PRESSURE: 42 MMHG

## 2020-03-11 LAB
ABSOLUTE BASOPHILS: 0 THOU/UL (ref 0–0.2)
ABSOLUTE EOSINOPHILS: 0.1 THOU/UL (ref 0–0.7)
ABSOLUTE MONOCYTES: 0.8 THOU/UL (ref 0–1.2)
ANION GAP SERPL CALC-SCNC: 7 MMOL/L (ref 7–16)
BASOPHILS NFR BLD AUTO: 0.2 %
BUN SERPL-MCNC: 50 MG/DL (ref 7–18)
CALCIUM SERPL-MCNC: 8.8 MG/DL (ref 8.5–10.1)
CHLORIDE SERPL-SCNC: 98 MMOL/L (ref 98–107)
CO2 SERPL-SCNC: 31 MMOL/L (ref 21–32)
CREAT SERPL-MCNC: 1.9 MG/DL (ref 0.6–1.3)
EOSINOPHIL NFR BLD: 1.1 %
GLUCOSE SERPL-MCNC: 100 MG/DL (ref 70–99)
GRANULOCYTES NFR BLD MANUAL: 63.2 %
HCT VFR BLD CALC: 34.7 % (ref 37–47)
HGB BLD-MCNC: 11.6 GM/DL (ref 12–15)
LYMPHOCYTES # BLD: 2.7 THOU/UL (ref 0.8–5.3)
LYMPHOCYTES NFR BLD AUTO: 27.8 %
MCH RBC QN AUTO: 31.6 PG (ref 26–34)
MCHC RBC AUTO-ENTMCNC: 33.3 G/DL (ref 28–37)
MCV RBC: 94.9 FL (ref 80–100)
MONOCYTES NFR BLD: 7.7 %
MPV: 8.8 FL. (ref 7.2–11.1)
NEUTROPHILS # BLD: 6.2 THOU/UL (ref 1.6–8.1)
NUCLEATED RBCS: 0 /100WBC
PLATELET COUNT*: 248 THOU/UL (ref 150–400)
POTASSIUM SERPL-SCNC: 4.1 MMOL/L (ref 3.5–5.1)
RBC # BLD AUTO: 3.66 MIL/UL (ref 4.2–5)
RDW-CV: 13.9 % (ref 10.5–14.5)
SODIUM SERPL-SCNC: 136 MMOL/L (ref 136–145)
WBC # BLD AUTO: 9.8 THOU/UL (ref 4–11)

## 2020-03-12 VITALS — DIASTOLIC BLOOD PRESSURE: 105 MMHG | SYSTOLIC BLOOD PRESSURE: 143 MMHG

## 2020-03-12 VITALS — DIASTOLIC BLOOD PRESSURE: 62 MMHG | SYSTOLIC BLOOD PRESSURE: 164 MMHG

## 2020-03-12 VITALS — SYSTOLIC BLOOD PRESSURE: 161 MMHG | DIASTOLIC BLOOD PRESSURE: 54 MMHG

## 2020-03-12 LAB
ABSOLUTE BASOPHILS: 0 THOU/UL (ref 0–0.2)
ABSOLUTE EOSINOPHILS: 0 THOU/UL (ref 0–0.7)
ABSOLUTE MONOCYTES: 0.6 THOU/UL (ref 0–1.2)
ANION GAP SERPL CALC-SCNC: 7 MMOL/L (ref 7–16)
BASOPHILS NFR BLD AUTO: 0.4 %
BUN SERPL-MCNC: 34 MG/DL (ref 7–18)
CALCIUM SERPL-MCNC: 8.2 MG/DL (ref 8.5–10.1)
CHLORIDE SERPL-SCNC: 104 MMOL/L (ref 98–107)
CO2 SERPL-SCNC: 28 MMOL/L (ref 21–32)
CREAT SERPL-MCNC: 1.2 MG/DL (ref 0.6–1.3)
EOSINOPHIL NFR BLD: 0.1 %
GLUCOSE SERPL-MCNC: 114 MG/DL (ref 70–99)
GRANULOCYTES NFR BLD MANUAL: 78.8 %
HCT VFR BLD CALC: 29.5 % (ref 37–47)
HGB BLD-MCNC: 10 GM/DL (ref 12–15)
LYMPHOCYTES # BLD: 2 THOU/UL (ref 0.8–5.3)
LYMPHOCYTES NFR BLD AUTO: 15.7 %
MCH RBC QN AUTO: 32.2 PG (ref 26–34)
MCHC RBC AUTO-ENTMCNC: 33.9 G/DL (ref 28–37)
MCV RBC: 95 FL (ref 80–100)
MONOCYTES NFR BLD: 5 %
MPV: 9.5 FL. (ref 7.2–11.1)
NEUTROPHILS # BLD: 9.8 THOU/UL (ref 1.6–8.1)
NUCLEATED RBCS: 0 /100WBC
PLATELET COUNT*: 206 THOU/UL (ref 150–400)
POTASSIUM SERPL-SCNC: 4 MMOL/L (ref 3.5–5.1)
RBC # BLD AUTO: 3.1 MIL/UL (ref 4.2–5)
RDW-CV: 13.7 % (ref 10.5–14.5)
SODIUM SERPL-SCNC: 139 MMOL/L (ref 136–145)
WBC # BLD AUTO: 12.5 THOU/UL (ref 4–11)

## 2020-03-13 VITALS — DIASTOLIC BLOOD PRESSURE: 40 MMHG | SYSTOLIC BLOOD PRESSURE: 125 MMHG

## 2020-06-05 NOTE — NUR
PT CARE ASSUMED AT 1930. SAT MAINTAINED IN RA. ALERT AND ORIENTED X4. CALL
LIGHT WITHIN REACH AND BED IN LOW POSITION. DENIES ANY CHEST PAIN AND SOB.
HOURLY ROUNDING DONE FOR PT SAFETY. PT STATED BACK PAIN, MEDICATION GIVEN PER
EMAR. DISPLAY PLAN FREE TEXT DISPLAY PLAN FREE TEXT DISPLAY PLAN FREE TEXT DISPLAY PLAN FREE TEXT DISPLAY PLAN FREE TEXT DISPLAY PLAN FREE TEXT DISPLAY PLAN FREE TEXT DISPLAY PLAN FREE TEXT